# Patient Record
Sex: MALE | Race: OTHER | Employment: OTHER | URBAN - METROPOLITAN AREA
[De-identification: names, ages, dates, MRNs, and addresses within clinical notes are randomized per-mention and may not be internally consistent; named-entity substitution may affect disease eponyms.]

---

## 2017-10-09 ENCOUNTER — ESTABLISHED COMPREHENSIVE EXAM (OUTPATIENT)
Dept: URBAN - METROPOLITAN AREA CLINIC 36 | Facility: CLINIC | Age: 72
End: 2017-10-09

## 2017-10-09 DIAGNOSIS — H52.7: ICD-10-CM

## 2017-10-09 PROCEDURE — 92014 COMPRE OPH EXAM EST PT 1/>: CPT

## 2017-10-09 PROCEDURE — 92015 DETERMINE REFRACTIVE STATE: CPT

## 2017-10-09 ASSESSMENT — TONOMETRY
OD_IOP_MMHG: 14
OS_IOP_MMHG: 14

## 2017-10-09 ASSESSMENT — VISUAL ACUITY
OS_CC: 20/30
OD_SC: 20/30
OS_SC: J3
OD_SC: J1
OS_SC: 20/40+2
OD_CC: 20/25-2

## 2019-08-29 ENCOUNTER — ESTABLISHED COMPREHENSIVE EXAM (OUTPATIENT)
Dept: URBAN - METROPOLITAN AREA CLINIC 36 | Facility: CLINIC | Age: 74
End: 2019-08-29

## 2019-08-29 DIAGNOSIS — H52.7: ICD-10-CM

## 2019-08-29 PROCEDURE — 92015 DETERMINE REFRACTIVE STATE: CPT

## 2019-08-29 PROCEDURE — 92014 COMPRE OPH EXAM EST PT 1/>: CPT

## 2019-08-29 ASSESSMENT — VISUAL ACUITY
OS_SC: 20/30-2
OD_CC: 20/25-1
OS_SC: J2
OS_CC: 20/30-2
OD_SC: J1
OD_SC: 20/40

## 2019-08-29 ASSESSMENT — TONOMETRY
OS_IOP_MMHG: 16
OD_IOP_MMHG: 16

## 2021-04-06 ENCOUNTER — ESTABLISHED COMPREHENSIVE EXAM (OUTPATIENT)
Dept: URBAN - METROPOLITAN AREA CLINIC 36 | Facility: CLINIC | Age: 76
End: 2021-04-06

## 2021-04-06 DIAGNOSIS — H01.024: ICD-10-CM

## 2021-04-06 DIAGNOSIS — H35.372: ICD-10-CM

## 2021-04-06 DIAGNOSIS — H01.021: ICD-10-CM

## 2021-04-06 DIAGNOSIS — H04.123: ICD-10-CM

## 2021-04-06 PROCEDURE — 92014 COMPRE OPH EXAM EST PT 1/>: CPT

## 2021-04-06 ASSESSMENT — VISUAL ACUITY
OD_SC: J1
OS_CC: 20/25-2
OD_SC: 20/50-2
OS_CC: J1
OS_SC: J1
OD_CC: 20/25-2
OD_CC: J1
OS_SC: 20/40+2

## 2021-04-06 ASSESSMENT — TONOMETRY
OS_IOP_MMHG: 14
OD_IOP_MMHG: 16

## 2021-11-03 NOTE — PROCEDURE NOTE: CLINICAL
PROCEDURE NOTE: Excision of Eyelid Lesion Left Upper Lid. Diagnosis: Eyelid Lesion, Uncertain Behavior. Anesthesia: Topical. Prep: Alcohol Prep/Wipe. Risks, benefits and alternatives discussed. Patient desires to proceed with excision of growth/lesion today. A drop of proparacaine was instilled in the involved eye. . The involved area was anesthetized using 1% lidocaine with epi. The lesion was excised using mini Westcotts and forceps and placed in formalin to be sent to pathology. The wound was cauterized to achieve hemostasis. Erythromycin ophthalmic ointment was applied. Post op instructions were given to the patient. The patient tolerated the procedure well and left in good condition. The patient understands to call us for any concerns. David Little

## 2022-04-21 NOTE — PROCEDURE NOTE: CLINICAL
PROCEDURE NOTE: Excision Chalazion, Single Right Lower Lid. Diagnosis: Chalazion. Anesthesia: Subconjunctival Lidocaine. Prep: Betadine Scrub. Risks, benefits and alternatives were discussed. Patient desires to proceed with excision and drainage of the chalazion today. A drop of proparacaine was instilled in the involved eye. The area was prepped using an alcohol wipe. Tetracaine was applied to the conjunctiva using a cotton tipped applicator. The involved area was anesthetized using 1% lidocaine with epi. A chalazion clamp was place d on the eyelid and the lid was everted revealing the blocked gland. A 15 blade was used to make an incision into the blocked gland. A curette was used to remove the blocked oil. The wall of the sac was removed using mini Gwen scissors. Cautery was used to achieve hemostasis. The clamp was removed and erythromycin ointment was instilled. . The patient tolerated the procedure well and left in good condition. They understand to call for any concerns. Chi Shirley

## 2022-07-19 NOTE — PATIENT DISCUSSION
Begin Maxitrol drops TID and Erythromycin oint at bedtime for two weeks, then stop. Sent through [address] [place] [address] Road.

## 2022-07-19 NOTE — PROCEDURE NOTE: CLINICAL
PROCEDURE NOTE: Excision Chalazion, Single Right Upper Lid. Diagnosis: Chalazion. Anesthesia: Subconjunctival Lidocaine. Prep: Betadine Scrub. Risks, benefits and alternatives were discussed. Patient desires to proceed with excision and drainage of the chalazion today. A drop of proparacaine was instilled in the involved eye. The area was prepped using an alcohol wipe. Tetracaine was applied to the conjunctiva using a cotton tipped applicator. The involved area was anesthetized using 1% lidocaine with epi. A chalazion clamp was place d on the eyelid and the lid was everted revealing the blocked gland. A 15 blade was used to make an incision into the blocked gland. A curette was used to remove the blocked oil. The wall of the sac was removed using mini Gwen scissors. Cautery was used to achieve hemostasis. The clamp was removed and erythromycin ointment was instilled. The eye was patched which can be removed in an hour and post op instructions were given. The patient tolerated the procedure well and left in good condition. They understand to call for any concerns. Kate Kemp

## 2022-12-28 NOTE — PATIENT DISCUSSION
Begin Maxitrol drops TID and Erythromycin oint at bedtime for two weeks, then stop. Sent through [address] [place] [address] Road.

## 2023-01-09 ENCOUNTER — COMPREHENSIVE EXAM (OUTPATIENT)
Dept: URBAN - METROPOLITAN AREA CLINIC 36 | Facility: CLINIC | Age: 78
End: 2023-01-09

## 2023-01-09 DIAGNOSIS — H01.021: ICD-10-CM

## 2023-01-09 DIAGNOSIS — H35.372: ICD-10-CM

## 2023-01-09 DIAGNOSIS — H01.024: ICD-10-CM

## 2023-01-09 DIAGNOSIS — Z96.1: ICD-10-CM

## 2023-01-09 DIAGNOSIS — H04.123: ICD-10-CM

## 2023-01-09 PROCEDURE — 92015GRNC REFRACTION GLASSES RECHECK NO CHARGE

## 2023-01-09 PROCEDURE — 92014 COMPRE OPH EXAM EST PT 1/>: CPT

## 2023-01-09 ASSESSMENT — TONOMETRY
OS_IOP_MMHG: 15
OD_IOP_MMHG: 15

## 2023-01-09 ASSESSMENT — VISUAL ACUITY
OS_SC: 20/25-1
OU_SC: 20/25
OS_CC: 20/25+1
OU_CC: 20/20-1
OU_SC: J1
OS_SC: J2
OD_CC: 20/25
OD_SC: 20/50-1
OD_SC: J2

## 2024-01-11 ENCOUNTER — ESTABLISHED PATIENT (OUTPATIENT)
Dept: URBAN - METROPOLITAN AREA CLINIC 36 | Facility: CLINIC | Age: 79
End: 2024-01-11

## 2024-01-11 DIAGNOSIS — H04.123: ICD-10-CM

## 2024-01-11 DIAGNOSIS — H01.024: ICD-10-CM

## 2024-01-11 DIAGNOSIS — H01.021: ICD-10-CM

## 2024-01-11 DIAGNOSIS — H35.372: ICD-10-CM

## 2024-01-11 PROCEDURE — 92014 COMPRE OPH EXAM EST PT 1/>: CPT

## 2024-01-11 ASSESSMENT — TONOMETRY
OD_IOP_MMHG: 16
OS_IOP_MMHG: 16

## 2024-01-11 ASSESSMENT — VISUAL ACUITY
OS_SC: J2
OD_SC: J1
OD_SC: 20/50
OS_SC: 20/25
OD_PH: 20/30

## 2025-01-14 ENCOUNTER — COMPREHENSIVE EXAM (OUTPATIENT)
Age: 80
End: 2025-01-14